# Patient Record
Sex: FEMALE
[De-identification: names, ages, dates, MRNs, and addresses within clinical notes are randomized per-mention and may not be internally consistent; named-entity substitution may affect disease eponyms.]

---

## 2022-05-12 ENCOUNTER — APPOINTMENT (OUTPATIENT)
Dept: ORTHOPEDIC SURGERY | Facility: CLINIC | Age: 77
End: 2022-05-12

## 2022-05-12 DIAGNOSIS — E11.9 TYPE 2 DIABETES MELLITUS W/OUT COMPLICATIONS: ICD-10-CM

## 2022-05-12 PROBLEM — Z00.00 ENCOUNTER FOR PREVENTIVE HEALTH EXAMINATION: Status: ACTIVE | Noted: 2022-05-12

## 2022-05-12 NOTE — HISTORY OF PRESENT ILLNESS
[4] : 4 [2] : 2 [Dull/Aching] : dull/aching [Sharp] : sharp [Intermittent] : intermittent [Rest] : rest [FreeTextEntry3] : 5/6/22 [FreeTextEntry5] : pt fell and tried to stop her fall with her hands . pt fracture her right wrist. pt feels discomfort in the right writs.  [de-identified] : motion

## 2022-07-25 ENCOUNTER — APPOINTMENT (OUTPATIENT)
Dept: ORTHOPEDIC SURGERY | Facility: CLINIC | Age: 77
End: 2022-07-25

## 2022-07-25 VITALS — HEIGHT: 64 IN | BODY MASS INDEX: 27.66 KG/M2 | WEIGHT: 162 LBS

## 2022-07-25 DIAGNOSIS — M67.431 GANGLION, RIGHT WRIST: ICD-10-CM

## 2022-07-25 PROCEDURE — 73130 X-RAY EXAM OF HAND: CPT | Mod: RT

## 2022-07-25 PROCEDURE — 99214 OFFICE O/P EST MOD 30 MIN: CPT | Mod: 25

## 2022-07-25 PROCEDURE — L3807: CPT

## 2022-07-25 PROCEDURE — 20551 NJX 1 TENDON ORIGIN/INSJ: CPT | Mod: RT

## 2022-07-25 RX ORDER — DICLOFENAC SODIUM 1% 10 MG/G
1 GEL TOPICAL DAILY
Qty: 1 | Refills: 3 | Status: ACTIVE | COMMUNITY
Start: 2022-07-25 | End: 1900-01-01

## 2022-07-25 NOTE — ASSESSMENT
[FreeTextEntry1] : Pt provided right GT CSI today.\par Pt tolerated px well and will rto in 3 weeks for f/u care (will consider right 1st cmc CSI at that time). \par Provided right Gamekeeper brace and Voltaren QID today.\par

## 2022-07-25 NOTE — HISTORY OF PRESENT ILLNESS
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 Pt states previous CSI provided moderate pain relief.\par Pt also complains of a right dorsal wrist mass as well as right hip pain x 1-2 weeks. There is no hx of trauma.\par Pt denies n/t or gait disturbance. \par Pt denies numbness/tingling.  [FreeTextEntry3] : 5/6/22 [FreeTextEntry5] : pt fell and tried to stop her fall with her hands . pt fracture her right wrist. pt feels discomfort in the right wrist.  [de-identified] : motion

## 2022-07-25 NOTE — IMAGING
[Right] : right wrist [de-identified] : Right hand with small ganglion cyst to the dorsal wrist. \par Right wrist with full and painfree ROM.\par Strength is 5/5 in all planes.\par Mildly diminished pinch strength due to severe right thumb CMC OA.\par There is swelling to this region.\par All digits are nvi with FAROM. \par  and intrinsic strength is 5/5. \par \par Right hip with no skin change/bony deformity.\par ROM is full and painfree.\par +TTP over the right GT region.\par No ttp over the ITB region. \par Gait is normal.\par RLE strength is 5/5/nvi.  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 / no wrist pathology noted (other than previously noted pathology)

## 2022-08-15 ENCOUNTER — APPOINTMENT (OUTPATIENT)
Dept: ORTHOPEDIC SURGERY | Facility: CLINIC | Age: 77
End: 2022-08-15

## 2022-08-15 VITALS — HEIGHT: 64 IN | WEIGHT: 162 LBS | BODY MASS INDEX: 27.66 KG/M2

## 2022-08-15 DIAGNOSIS — M70.61 TROCHANTERIC BURSITIS, RIGHT HIP: ICD-10-CM

## 2022-08-15 DIAGNOSIS — M18.11 UNILATERAL PRIMARY OSTEOARTHRITIS OF FIRST CARPOMETACARPAL JOINT, RIGHT HAND: ICD-10-CM

## 2022-08-15 PROCEDURE — 99213 OFFICE O/P EST LOW 20 MIN: CPT

## 2022-08-15 RX ORDER — DICLOFENAC SODIUM 1% 10 MG/G
1 GEL TOPICAL DAILY
Qty: 2 | Refills: 3 | Status: ACTIVE | COMMUNITY
Start: 2022-08-15 | End: 1900-01-01

## 2022-08-17 PROBLEM — M18.11 PRIMARY OSTEOARTHRITIS OF FIRST CARPOMETACARPAL JOINT OF RIGHT HAND: Status: ACTIVE | Noted: 2022-07-25

## 2022-08-17 PROBLEM — M70.61 GREATER TROCHANTERIC BURSITIS OF RIGHT HIP: Status: ACTIVE | Noted: 2022-07-25

## 2022-08-17 NOTE — IMAGING
[de-identified] : Right 1st cmc joint with TTP and + Basal Joint Compression Tests (mild)\par All digits are nvi with FAROM. \par Pt with 5/5 , intrinsic and pinch strength is 5/5. \par \par Right hip with full and painfree ROM.\par There is no longer ttp over the right GT bursitis. \par

## 2022-08-17 NOTE — ASSESSMENT
[FreeTextEntry1] : The patient was advised of the diagnosis. The natural history of the pathology was explained in full to the patient in layman's terms. All questions were answered. The risks and benefits of surgical and non-surgical treatment alternatives were explained in full to the patient.\par \par NSAIDs recommended.  Patient warned of risk of NSAID medication to stomach and GI tract, risk of increase blood pressure, cardiac risk, and risk of fluid retention.  The patient should clear taking medication with internist/PMD if any problem with heart, blood pressure, or GI system exists.\par \par Pt provided rx for Voltaren gel qid prn pain.\par Will utilize Gamekeeper brace prn pain. \par Pt will rto prn and is informed that she may obtain CSI to the cmc joint q 3 mos.

## 2022-08-17 NOTE — HISTORY OF PRESENT ILLNESS
[10] : 10 [2] : 2 [Dull/Aching] : dull/aching [Sharp] : sharp [Intermittent] : intermittent [Rest] : rest [Retired] : Work status: retired [de-identified] : 8/15/2022: Right GT bursitis has improved s/p CSI. \par Pt is today requesting a right 1st CMC CSI. 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 Pt states previous CSI provided moderate pain relief.\par Pt also complains of a right dorsal wrist mass as well as right hip pain x 1-2 weeks. There is no hx of trauma.\par Pt denies n/t or gait disturbance. \par Pt denies numbness/tingling.  [FreeTextEntry3] : 5/6/22 [FreeTextEntry5] : pt fell and tried to stop her fall with her hands . pt fracture her right wrist. pt feels discomfort in the right wrist.  [de-identified] : motion